# Patient Record
Sex: MALE | Race: BLACK OR AFRICAN AMERICAN | Employment: UNEMPLOYED | URBAN - METROPOLITAN AREA
[De-identification: names, ages, dates, MRNs, and addresses within clinical notes are randomized per-mention and may not be internally consistent; named-entity substitution may affect disease eponyms.]

---
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
## 2023-01-11 ENCOUNTER — APPOINTMENT (OUTPATIENT)
Dept: GENERAL RADIOLOGY | Age: 18
End: 2023-01-11
Attending: PHYSICIAN ASSISTANT
Payer: MEDICAID

## 2023-01-11 ENCOUNTER — HOSPITAL ENCOUNTER (EMERGENCY)
Age: 18
Discharge: STILL ADMITTED OR ADMINISTRATIVE | End: 2023-01-11
Payer: MEDICAID

## 2023-01-11 ENCOUNTER — HOSPITAL ENCOUNTER (EMERGENCY)
Age: 18
Discharge: HOME | End: 2023-01-11
Attending: EMERGENCY MEDICINE
Payer: MEDICAID

## 2023-01-11 VITALS
TEMPERATURE: 98.8 F | RESPIRATION RATE: 11 BRPM | WEIGHT: 168 LBS | DIASTOLIC BLOOD PRESSURE: 62 MMHG | SYSTOLIC BLOOD PRESSURE: 125 MMHG | HEIGHT: 68 IN | HEART RATE: 67 BPM | OXYGEN SATURATION: 98 % | BODY MASS INDEX: 25.46 KG/M2

## 2023-01-11 VITALS
SYSTOLIC BLOOD PRESSURE: 137 MMHG | DIASTOLIC BLOOD PRESSURE: 65 MMHG | RESPIRATION RATE: 17 BRPM | TEMPERATURE: 98.8 F | HEART RATE: 67 BPM | BODY MASS INDEX: 24.71 KG/M2 | HEIGHT: 68 IN | OXYGEN SATURATION: 97 % | WEIGHT: 163 LBS

## 2023-01-11 DIAGNOSIS — R56.9 SEIZURE (HCC): Primary | ICD-10-CM

## 2023-01-11 LAB
ALBUMIN SERPL-MCNC: 4 G/DL (ref 3.4–5)
ALBUMIN/GLOB SERPL: 1.1 (ref 0.8–1.7)
ALP SERPL-CCNC: 96 U/L (ref 45–117)
ALT SERPL-CCNC: 28 U/L (ref 16–61)
AMPHET UR QL SCN: NEGATIVE
ANION GAP SERPL CALC-SCNC: 5 MMOL/L (ref 3–18)
AST SERPL-CCNC: 13 U/L (ref 10–38)
BARBITURATES UR QL SCN: NEGATIVE
BASOPHILS # BLD: 0 K/UL (ref 0–0.1)
BASOPHILS NFR BLD: 0 % (ref 0–2)
BENZODIAZ UR QL: NEGATIVE
BILIRUB SERPL-MCNC: 0.4 MG/DL (ref 0.2–1)
BUN SERPL-MCNC: 10 MG/DL (ref 7–18)
BUN/CREAT SERPL: 10 (ref 12–20)
CALCIUM SERPL-MCNC: 9.5 MG/DL (ref 8.5–10.1)
CANNABINOIDS UR QL SCN: NEGATIVE
CHLORIDE SERPL-SCNC: 105 MMOL/L (ref 100–111)
CO2 SERPL-SCNC: 29 MMOL/L (ref 21–32)
COCAINE UR QL SCN: NEGATIVE
CREAT SERPL-MCNC: 1.02 MG/DL (ref 0.6–1.3)
D DIMER PPP FEU-MCNC: <0.27 UG/ML(FEU)
DIFFERENTIAL METHOD BLD: ABNORMAL
EOSINOPHIL # BLD: 0.2 K/UL (ref 0–0.4)
EOSINOPHIL NFR BLD: 1 % (ref 0–5)
ERYTHROCYTE [DISTWIDTH] IN BLOOD BY AUTOMATED COUNT: 13.3 % (ref 11.6–14.5)
GLOBULIN SER CALC-MCNC: 3.6 G/DL (ref 2–4)
GLUCOSE SERPL-MCNC: 129 MG/DL (ref 74–99)
HCT VFR BLD AUTO: 42.6 % (ref 36–48)
HDSCOM,HDSCOM: NORMAL
HGB BLD-MCNC: 14.2 G/DL (ref 13–16)
IMM GRANULOCYTES # BLD AUTO: 0.1 K/UL (ref 0–0.04)
IMM GRANULOCYTES NFR BLD AUTO: 0 % (ref 0–0.5)
LACTATE BLD-SCNC: 0.96 MMOL/L (ref 0.4–2)
LYMPHOCYTES # BLD: 2.3 K/UL (ref 0.9–3.6)
LYMPHOCYTES NFR BLD: 19 % (ref 21–52)
MCH RBC QN AUTO: 27 PG (ref 24–34)
MCHC RBC AUTO-ENTMCNC: 33.3 G/DL (ref 31–37)
MCV RBC AUTO: 81.1 FL (ref 78–100)
METHADONE UR QL: NEGATIVE
MONOCYTES # BLD: 0.6 K/UL (ref 0.05–1.2)
MONOCYTES NFR BLD: 5 % (ref 3–10)
NEUTS SEG # BLD: 9.2 K/UL (ref 1.8–8)
NEUTS SEG NFR BLD: 74 % (ref 40–73)
NRBC # BLD: 0 K/UL (ref 0–0.01)
NRBC BLD-RTO: 0 PER 100 WBC
OPIATES UR QL: NEGATIVE
PCP UR QL: NEGATIVE
PLATELET # BLD AUTO: 324 K/UL (ref 135–420)
PMV BLD AUTO: 9.7 FL (ref 9.2–11.8)
POTASSIUM SERPL-SCNC: 3.5 MMOL/L (ref 3.5–5.5)
PROT SERPL-MCNC: 7.6 G/DL (ref 6.4–8.2)
RBC # BLD AUTO: 5.25 M/UL (ref 4.35–5.65)
SODIUM SERPL-SCNC: 139 MMOL/L (ref 136–145)
TROPONIN-HIGH SENSITIVITY: 18 NG/L (ref 0–78)
WBC # BLD AUTO: 12.4 K/UL (ref 4.6–13.2)

## 2023-01-11 PROCEDURE — 84484 ASSAY OF TROPONIN QUANT: CPT

## 2023-01-11 PROCEDURE — 74011250636 HC RX REV CODE- 250/636: Performed by: PHYSICIAN ASSISTANT

## 2023-01-11 PROCEDURE — 80307 DRUG TEST PRSMV CHEM ANLYZR: CPT

## 2023-01-11 PROCEDURE — 74011250637 HC RX REV CODE- 250/637: Performed by: PHYSICIAN ASSISTANT

## 2023-01-11 PROCEDURE — 71045 X-RAY EXAM CHEST 1 VIEW: CPT

## 2023-01-11 PROCEDURE — 85379 FIBRIN DEGRADATION QUANT: CPT

## 2023-01-11 PROCEDURE — 83605 ASSAY OF LACTIC ACID: CPT

## 2023-01-11 PROCEDURE — 85025 COMPLETE CBC W/AUTO DIFF WBC: CPT

## 2023-01-11 PROCEDURE — 99284 EMERGENCY DEPT VISIT MOD MDM: CPT

## 2023-01-11 PROCEDURE — 80053 COMPREHEN METABOLIC PANEL: CPT

## 2023-01-11 RX ORDER — LEVETIRACETAM 500 MG/1
500 TABLET ORAL
Status: COMPLETED | OUTPATIENT
Start: 2023-01-11 | End: 2023-01-11

## 2023-01-11 RX ORDER — LEVETIRACETAM 500 MG/1
500 TABLET ORAL 2 TIMES DAILY
Qty: 60 TABLET | Refills: 1 | Status: SHIPPED | OUTPATIENT
Start: 2023-01-11

## 2023-01-11 RX ORDER — ACETAMINOPHEN 500 MG
1000 TABLET ORAL ONCE
Status: COMPLETED | OUTPATIENT
Start: 2023-01-11 | End: 2023-01-11

## 2023-01-11 RX ADMIN — SODIUM CHLORIDE 1000 ML: 9 INJECTION, SOLUTION INTRAVENOUS at 18:43

## 2023-01-11 RX ADMIN — LEVETIRACETAM 500 MG: 500 TABLET, FILM COATED ORAL at 20:25

## 2023-01-11 RX ADMIN — ACETAMINOPHEN 1000 MG: 500 TABLET ORAL at 18:43

## 2023-01-11 NOTE — ED NOTES
Seizure pads applied to patients bed. Mother & sister at bedside. Call light within reach. No needs at this time.

## 2023-01-11 NOTE — ED PROVIDER NOTES
EMERGENCY DEPARTMENT HISTORY & PHYSICAL EXAM    [facility] EMERGENCY DEPT  1/11/2023, 6:31 PM    Clinical Impression:  1. Seizure ([address]        Assessment/Differential Diagnosis:     Ddx also of consciousness, seizure activity, cardiac event, drug use, syncope all considered    ED Course:   Initial assessment performed. The patients presenting problems have been discussed, and they are in agreement with the care plan formulated and outlined with them. I have encouraged them to ask questions as they arise throughout their visit. Pt here by EMS with episode of possible seizure. Pt was at work, sitting talking with coworker when experienced sensation of heart racing and then LOC. Coworkers lowered to ground and report pt unresponsive, with movement of arms/legs for about 2 minutes, then confused with eyes open, not talking, and few minutes later would respond to commands. EMS reports postictal. Pt feeling back to baseline when evaluated by me. Pt reports sim episode 2 weeks ago, home, sitting in bed. Reports sim episode about 5/2022, recalls neg CT head. Mom reports had very infrequent similar episodes when younger. No recent illness, head injury, neck pain, ext weakness or numbness. Review of records with CT head neg 2022. No record of EEG. Workup tonight with normal labs, pt appears well, A&O X 3.      7:43 PM  Discussed with Dr. Riley Mina, neurologist, who feels pt should start keppra, 500mg, BID, seizure precautions, with no driving X 6 months. Follow up in his office. Discussed plan with pt. Pt understands no driving, climbing, bathing alone, or dangerous activity. Return precautions given      Medical Chart Review:  I have reviewed triage nursing documentation. Review of old medical records with the following pertinent information:       Disposition:  Home  in good condition.       Chief Complaint   Patient presents with    Seizure     Pt brought in by medics for seizure. Pt was at work & had witnessed seizure lasting approx 2 mins. Patient has a hx of seizures but does not take any medication for it. Per EMS, pt was post ictal upon their arrival. Patient now alert and oriented.  in field for EMS. HPI:    The history is provided by patient and mother. No  used. Fozia Diane is a 19 y.o. male presenting to the Emergency Department with complaints of possible seizure activity. Patient was sitting at work in a chair talking to coworkers. He states he did feel as if his heart was beating fast and then he lost consciousness.  say he did tilt to the side and was gently laid to the ground. They felt that he was flailing his arms and legs for about 2 minutes. There was no tongue biting, head injury or urinary incontinence. The patient then began to open his eyes and seemed very confused. EMS arrived and transported patient to the ED. While in transport patient was returning to baseline. When I evaluated him in the ED patient states he is feeling back to normal with slight frontal headache. No neck pain. No extremity weakness or numbness. He states vision is normal at this time. No difficulty swallowing. No shortness of breath or chest pain. No recent illness or fever. In further discussion patient states he had a similar episode while sitting in his bed 2 weeks ago where he felt his heart race and then he awoke and laying flat on the bed. He remembers feeling somewhat confused for about 15 minutes. He also gives another episode back in May of this year, evaluated here with CT head negative. Again with only symptom of feeling as if his heart was racing before he lost consciousness. In discussion with mom she states that he has had these episodes of very intermittently when he was younger. He was followed by cardiology and had a Holter monitor. He was supposed to return for stress echo but has not done that.   They did not follow-up with neurology. No prior EEG. She denies any chronic medical problems and takes no chronic medications. No prior surgeries or hospital stays. He denies smoking, drinking alcohol or drug use. I have reviewed all PMHX, FMHX and Social Hx as entered into the medical record in the chart below using the Epic Template. Review of Systems:  Constitutional: neg for fever, chills  ENT:  neg for URI symptoms. Neg for vision change currently  Respiratory:  neg for cough, shortness of breath  Neck: neg for neck pain/stiffness  Cardiovascular:  neg for chest pain  GI:  neg for abdominal pain. Neg for nausea, no vomiting  :  No urinary symptoms. No Flank pain. MSK: neg for back pain. Neg for extremity pain or weakness. No injury. Integumentary: no rash  Neurological: positive for headaches, neg head injury. All other systems reviewed negative with exception of positives in ROS and HPI. Past Medical History:  No past medical history on file. Past Surgical History:  No past surgical history on file. Family History:  No family history on file. Social History:  Social History     Tobacco Use    Smoking status: Never    Smokeless tobacco: Never   Substance Use Topics    Alcohol use: No       Allergies:  No Known Allergies    Vital Signs:  Vitals:    01/11/23 1808   BP: 137/65   Pulse: 79   Resp: 12   Temp: 98.8 °F (37.1 °C)   SpO2: 100%   Weight: 76.2 kg (168 lb)   Height: 5' 8\" (1.727 m)     Physical Exam:  Vital Signs Reviewed. Nursing Notes Reviewed. Constitutional:  Well developed, well nourished patient. Appearance and behavior are age and situation appropriate. Head: Normocephalic, Atraumatic   Eyes: Visual acuity grossly normal by my exam. Conjunctiva clear, Sclera anicteric. PERRLA. EOMs intact. Eyelids normal   ENT:hearing grossly intact, TMs normal. Throat normal. No mouth trauma   Neck:  supple, FROM   Lungs: No respiratory distress.  Lungs CTAB   CV:  RR&R without murmur Thorax: no chest wall tenderness to palpation. No signs of trauma   Extremities: normal movement of all 4 extremities. No signs of trauma, No pain with palpation. Neuro:  A&O x 3. CN II-XII grossly intact. No gross neuro deficits. No facial asymmetry with testing. Cerebellar tests intact. Strength and sensation intact, bilat equal with testing of UE and LE   Skin:  Warm, dry, no rash. Spine:  No tenderness to palpation over the cervical spine area. No tenderness to palpation over thoracic or lumbar spine. No signs of trauma. No bony defect on my exam. No swelling. Diagnostics:    Labs -     Recent Results (from the past 12 hour(s))   CBC WITH AUTOMATED DIFF    Collection Time: 01/11/23  6:00 PM   Result Value Ref Range    WBC 12.4 4.6 - 13.2 K/uL    RBC 5.25 4.35 - 5.65 M/uL    HGB 14.2 13.0 - 16.0 g/dL    HCT 42.6 36.0 - 48.0 %    MCV 81.1 78.0 - 100.0 FL    MCH 27.0 24.0 - 34.0 PG    MCHC 33.3 31.0 - 37.0 g/dL    RDW 13.3 11.6 - 14.5 %    PLATELET 643 883 - 305 K/uL    MPV 9.7 9.2 - 11.8 FL    NRBC 0.0 0  WBC    ABSOLUTE NRBC 0.00 0.00 - 0.01 K/uL    NEUTROPHILS 74 (H) 40 - 73 %    LYMPHOCYTES 19 (L) 21 - 52 %    MONOCYTES 5 3 - 10 %    EOSINOPHILS 1 0 - 5 %    BASOPHILS 0 0 - 2 %    IMMATURE GRANULOCYTES 0 0.0 - 0.5 %    ABS. NEUTROPHILS 9.2 (H) 1.8 - 8.0 K/UL    ABS. LYMPHOCYTES 2.3 0.9 - 3.6 K/UL    ABS. MONOCYTES 0.6 0.05 - 1.2 K/UL    ABS. EOSINOPHILS 0.2 0.0 - 0.4 K/UL    ABS. BASOPHILS 0.0 0.0 - 0.1 K/UL    ABS. IMM.  GRANS. 0.1 (H) 0.00 - 0.04 K/UL    DF AUTOMATED     METABOLIC PANEL, COMPREHENSIVE    Collection Time: 01/11/23  6:00 PM   Result Value Ref Range    Sodium 139 136 - 145 mmol/L    Potassium 3.5 3.5 - 5.5 mmol/L    Chloride 105 100 - 111 mmol/L    CO2 29 21 - 32 mmol/L    Anion gap 5 3.0 - 18 mmol/L    Glucose 129 (H) 74 - 99 mg/dL    BUN 10 7.0 - 18 MG/DL    Creatinine 1.02 0.6 - 1.3 MG/DL    BUN/Creatinine ratio 10 (L) 12 - 20      eGFR >60 >60 ml/min/1.73m2    Calcium 9.5 8.5 - 10.1 MG/DL    Bilirubin, total 0.4 0.2 - 1.0 MG/DL    ALT (SGPT) 28 16 - 61 U/L    AST (SGOT) 13 10 - 38 U/L    Alk. phosphatase 96 45 - 117 U/L    Protein, total 7.6 6.4 - 8.2 g/dL    Albumin 4.0 3.4 - 5.0 g/dL    Globulin 3.6 2.0 - 4.0 g/dL    A-G Ratio 1.1 0.8 - 1.7     TROPONIN-HIGH SENSITIVITY    Collection Time: 01/11/23  6:00 PM   Result Value Ref Range    Troponin-High Sensitivity 18 0 - 78 ng/L   D DIMER    Collection Time: 01/11/23  6:00 PM   Result Value Ref Range    D DIMER <0.27 <0.46 ug/ml(FEU)   POC LACTIC ACID    Collection Time: 01/11/23  6:50 PM   Result Value Ref Range    Lactic Acid (POC) 0.96 0.40 - 2.00 mmol/L       Radiologic Studies -   XR CHEST PORT   Final Result      No acute findings. CT Results  (Last 48 hours)      None          CXR Results  (Last 48 hours)                 01/11/23 1847  XR CHEST PORT Final result    Impression:      No acute findings. Narrative:  HISTORY: Seizure, syncope. COMPARISON: 2/20/2019. FINDINGS:       A portable view of the chest:       The lungs and pleural spaces are clear. The mediastinum is unremarkable in   appearance. Medications given in the ED-  Medications   levETIRAcetam (KEPPRA) tablet 500 mg (has no administration in time range)   sodium chloride 0.9 % bolus infusion 1,000 mL (1,000 mL IntraVENous New Bag 1/11/23 1843)   acetaminophen (TYLENOL) tablet 1,000 mg (1,000 mg Oral Given 1/11/23 1843)       Please note that this dictation was completed with [employer], the computer voice recognition software. Quite often unanticipated grammatical, syntax, homophones, and other interpretive errors are inadvertently transcribed by the computer software. Please disregard these errors. Please excuse any errors that have escaped final proofreading.

## 2023-01-12 LAB
ATRIAL RATE: 70 BPM
CALCULATED P AXIS, ECG09: 43 DEGREES
CALCULATED R AXIS, ECG10: 65 DEGREES
CALCULATED T AXIS, ECG11: 40 DEGREES
DIAGNOSIS, 93000: NORMAL
P-R INTERVAL, ECG05: 160 MS
Q-T INTERVAL, ECG07: 352 MS
QRS DURATION, ECG06: 104 MS
QTC CALCULATION (BEZET), ECG08: 380 MS
VENTRICULAR RATE, ECG03: 70 BPM

## 2023-01-12 NOTE — DISCHARGE INSTRUCTIONS
Your symptoms today are very suggestive of a seizure. Discussion with Dr. Katie Rosenthal, neurologist, with recommendation to start you on antiseizure medication. Keppra was prescribed. 1 tablet twice a day until you are seen by the neurologist.  Call their office tomorrow for early follow-up.   Is very important that you follow seizure precautions  NO DRIVING FOR THE NEXT 6 MONTHS  NO CLIMBING  NO BATHING ALONE  NO DANGEROUS ACTIVITY  Follow up with cardiology as discussed  Return to ED if new/worsening symptoms or new concerns